# Patient Record
Sex: FEMALE | Race: BLACK OR AFRICAN AMERICAN | NOT HISPANIC OR LATINO | URBAN - NONMETROPOLITAN AREA
[De-identification: names, ages, dates, MRNs, and addresses within clinical notes are randomized per-mention and may not be internally consistent; named-entity substitution may affect disease eponyms.]

---

## 2017-09-26 PROBLEM — Z96.1: Noted: 2017-09-26

## 2017-09-26 PROBLEM — H26.491: Noted: 2017-09-26

## 2017-09-26 PROBLEM — E11.9: Noted: 2017-09-26

## 2017-09-26 PROBLEM — H35.342: Noted: 2017-03-29

## 2017-09-26 PROBLEM — H16.223: Noted: 2017-09-26

## 2017-09-26 PROBLEM — H35.3131: Noted: 2017-03-29

## 2017-09-26 PROBLEM — H35.371: Noted: 2017-09-26

## 2019-05-29 ENCOUNTER — IMPORTED ENCOUNTER (OUTPATIENT)
Dept: URBAN - NONMETROPOLITAN AREA CLINIC 1 | Facility: CLINIC | Age: 84
End: 2019-05-29

## 2019-05-29 PROCEDURE — 99213 OFFICE O/P EST LOW 20 MIN: CPT

## 2019-05-29 NOTE — PATIENT DISCUSSION
AMD - dry-Explained dry AMD and advised that there are no treatments available at this time.-Continue AREDS 2 MVT. -Continue Amsler grid monitoring daily. Pt is to contact our office if any changes are noted. -History of Mac Hole OS repair by [employer] in 2009. Epiretinal membrane:. OD-Discussed findings of exam in detail with the patient.-Discussed the signs of worsening of the disease. [place]row AODM x 2008. No BDR noted today.-Stressed the importance of keeping blood sugars under control and regular visits with PCP. -Explained the possible effects of poorly controlled diabetes and the damage that diabetes can cause to ocular health. -Pt instructed to contact our office with any vision changes. JOSE-Explained JOSE and associated symptoms.-Recommend increasing Omega 3s. - Continue Tears OU. RTC 9 month dilation; 's Notes: MAC OCT - 5/23/18DFE- 12/3/18

## 2022-04-15 ASSESSMENT — TONOMETRY
OS_IOP_MMHG: 18
OD_IOP_MMHG: 15

## 2022-04-15 ASSESSMENT — VISUAL ACUITY
OS_SC: 20/400
OD_SC: 20/50